# Patient Record
Sex: MALE | Race: WHITE | NOT HISPANIC OR LATINO | Employment: FULL TIME | URBAN - METROPOLITAN AREA
[De-identification: names, ages, dates, MRNs, and addresses within clinical notes are randomized per-mention and may not be internally consistent; named-entity substitution may affect disease eponyms.]

---

## 2022-10-03 ENCOUNTER — HOSPITAL ENCOUNTER (EMERGENCY)
Facility: HOSPITAL | Age: 52
Discharge: HOME | End: 2022-10-03
Attending: EMERGENCY MEDICINE
Payer: COMMERCIAL

## 2022-10-03 VITALS
DIASTOLIC BLOOD PRESSURE: 66 MMHG | OXYGEN SATURATION: 99 % | HEIGHT: 64 IN | BODY MASS INDEX: 29.02 KG/M2 | WEIGHT: 170 LBS | RESPIRATION RATE: 14 BRPM | SYSTOLIC BLOOD PRESSURE: 103 MMHG | TEMPERATURE: 98 F | HEART RATE: 75 BPM

## 2022-10-03 DIAGNOSIS — R42 VERTIGO: Primary | ICD-10-CM

## 2022-10-03 DIAGNOSIS — R42 DIZZY: ICD-10-CM

## 2022-10-03 LAB
ALBUMIN SERPL BCP-MCNC: 4.4 G/DL (ref 3.5–5.2)
ALP SERPL-CCNC: 62 U/L (ref 55–135)
ALT SERPL W/O P-5'-P-CCNC: 32 U/L (ref 10–44)
ANION GAP SERPL CALC-SCNC: 8 MMOL/L (ref 8–16)
AST SERPL-CCNC: 24 U/L (ref 10–40)
BASOPHILS # BLD AUTO: 0.04 K/UL (ref 0–0.2)
BASOPHILS NFR BLD: 0.4 % (ref 0–1.9)
BILIRUB SERPL-MCNC: 0.9 MG/DL (ref 0.1–1)
BNP SERPL-MCNC: 23 PG/ML (ref 0–99)
BUN SERPL-MCNC: 14 MG/DL (ref 6–20)
CALCIUM SERPL-MCNC: 9.3 MG/DL (ref 8.7–10.5)
CHLORIDE SERPL-SCNC: 104 MMOL/L (ref 95–110)
CO2 SERPL-SCNC: 24 MMOL/L (ref 23–29)
CREAT SERPL-MCNC: 0.9 MG/DL (ref 0.5–1.4)
DIFFERENTIAL METHOD: NORMAL
EOSINOPHIL # BLD AUTO: 0.1 K/UL (ref 0–0.5)
EOSINOPHIL NFR BLD: 0.6 % (ref 0–8)
ERYTHROCYTE [DISTWIDTH] IN BLOOD BY AUTOMATED COUNT: 12.4 % (ref 11.5–14.5)
EST. GFR  (NO RACE VARIABLE): >60 ML/MIN/1.73 M^2
GLUCOSE SERPL-MCNC: 91 MG/DL (ref 70–110)
HCT VFR BLD AUTO: 42.1 % (ref 40–54)
HGB BLD-MCNC: 14.4 G/DL (ref 14–18)
IMM GRANULOCYTES # BLD AUTO: 0.04 K/UL (ref 0–0.04)
IMM GRANULOCYTES NFR BLD AUTO: 0.4 % (ref 0–0.5)
LYMPHOCYTES # BLD AUTO: 2.4 K/UL (ref 1–4.8)
LYMPHOCYTES NFR BLD: 23.9 % (ref 18–48)
MCH RBC QN AUTO: 29.6 PG (ref 27–31)
MCHC RBC AUTO-ENTMCNC: 34.2 G/DL (ref 32–36)
MCV RBC AUTO: 86 FL (ref 82–98)
MONOCYTES # BLD AUTO: 0.5 K/UL (ref 0.3–1)
MONOCYTES NFR BLD: 5.3 % (ref 4–15)
NEUTROPHILS # BLD AUTO: 6.9 K/UL (ref 1.8–7.7)
NEUTROPHILS NFR BLD: 69.4 % (ref 38–73)
NRBC BLD-RTO: 0 /100 WBC
PLATELET # BLD AUTO: 371 K/UL (ref 150–450)
PMV BLD AUTO: 10.4 FL (ref 9.2–12.9)
POTASSIUM SERPL-SCNC: 3.7 MMOL/L (ref 3.5–5.1)
PROT SERPL-MCNC: 7.7 G/DL (ref 6–8.4)
RBC # BLD AUTO: 4.87 M/UL (ref 4.6–6.2)
SODIUM SERPL-SCNC: 136 MMOL/L (ref 136–145)
TROPONIN I SERPL DL<=0.01 NG/ML-MCNC: <0.03 NG/ML
WBC # BLD AUTO: 9.98 K/UL (ref 3.9–12.7)

## 2022-10-03 PROCEDURE — 96375 TX/PRO/DX INJ NEW DRUG ADDON: CPT

## 2022-10-03 PROCEDURE — 96374 THER/PROPH/DIAG INJ IV PUSH: CPT

## 2022-10-03 PROCEDURE — 93005 ELECTROCARDIOGRAM TRACING: CPT | Performed by: INTERNAL MEDICINE

## 2022-10-03 PROCEDURE — 85025 COMPLETE CBC W/AUTO DIFF WBC: CPT | Performed by: EMERGENCY MEDICINE

## 2022-10-03 PROCEDURE — 99285 EMERGENCY DEPT VISIT HI MDM: CPT | Mod: 25

## 2022-10-03 PROCEDURE — 93010 EKG 12-LEAD: ICD-10-PCS | Mod: ,,, | Performed by: INTERNAL MEDICINE

## 2022-10-03 PROCEDURE — 93010 ELECTROCARDIOGRAM REPORT: CPT | Mod: ,,, | Performed by: INTERNAL MEDICINE

## 2022-10-03 PROCEDURE — 80053 COMPREHEN METABOLIC PANEL: CPT | Performed by: EMERGENCY MEDICINE

## 2022-10-03 PROCEDURE — 96361 HYDRATE IV INFUSION ADD-ON: CPT

## 2022-10-03 PROCEDURE — 63600175 PHARM REV CODE 636 W HCPCS: Performed by: EMERGENCY MEDICINE

## 2022-10-03 PROCEDURE — 84484 ASSAY OF TROPONIN QUANT: CPT | Performed by: EMERGENCY MEDICINE

## 2022-10-03 PROCEDURE — 83880 ASSAY OF NATRIURETIC PEPTIDE: CPT | Performed by: EMERGENCY MEDICINE

## 2022-10-03 RX ORDER — LORAZEPAM 2 MG/ML
1 INJECTION INTRAMUSCULAR
Status: COMPLETED | OUTPATIENT
Start: 2022-10-03 | End: 2022-10-03

## 2022-10-03 RX ORDER — ONDANSETRON 2 MG/ML
8 INJECTION INTRAMUSCULAR; INTRAVENOUS
Status: COMPLETED | OUTPATIENT
Start: 2022-10-03 | End: 2022-10-03

## 2022-10-03 RX ORDER — MECLIZINE HYDROCHLORIDE 25 MG/1
25 TABLET ORAL 3 TIMES DAILY PRN
Qty: 20 TABLET | Refills: 0 | Status: SHIPPED | OUTPATIENT
Start: 2022-10-03

## 2022-10-03 RX ORDER — ONDANSETRON 4 MG/1
4 TABLET, FILM COATED ORAL EVERY 6 HOURS PRN
Qty: 20 TABLET | Refills: 1 | Status: SHIPPED | OUTPATIENT
Start: 2022-10-03 | End: 2023-10-03

## 2022-10-03 RX ADMIN — SODIUM CHLORIDE, SODIUM LACTATE, POTASSIUM CHLORIDE, AND CALCIUM CHLORIDE 1000 ML: .6; .31; .03; .02 INJECTION, SOLUTION INTRAVENOUS at 04:10

## 2022-10-03 RX ADMIN — LORAZEPAM 1 MG: 2 INJECTION INTRAMUSCULAR; INTRAVENOUS at 04:10

## 2022-10-03 RX ADMIN — ONDANSETRON HYDROCHLORIDE 8 MG: 2 SOLUTION INTRAMUSCULAR; INTRAVENOUS at 04:10

## 2022-10-03 NOTE — FIRST PROVIDER EVALUATION
Emergency Department TeleTriage Encounter Note      CHIEF COMPLAINT    Chief Complaint   Patient presents with    Dizziness     Dizzy and loopy with c/o fluid in his ears.        VITAL SIGNS   Initial Vitals [10/03/22 1320]   BP Pulse Resp Temp SpO2   (!) 152/96 65 16 97.8 °F (36.6 °C) 97 %      MAP       --            ALLERGIES    Review of patient's allergies indicates:  No Known Allergies    PROVIDER TRIAGE NOTE  This is a teletriage evaluation of a 52 y.o. male presenting to the ED complaining of dizziness. Patient reports dizziness this morning when he stood up too fast. Since then the dizziness has been getting worst. He denies previous episodes. He denies numbness, tingling, weakness, or headache.     Initial orders will be placed and care will be transferred to an alternate provider when patient is roomed for a full evaluation. Any additional orders and the final disposition will be determined by that provider.         ORDERS  Labs Reviewed - No data to display    ED Orders (720h ago, onward)      None              Virtual Visit Note: The provider triage portion of this emergency department evaluation and documentation was performed via [employer], a HIPAA-compliant telemedicine application, in concert with a tele-presenter in the room. A face to face patient evaluation with one of my colleagues will occur once the patient is placed in an emergency department room.      DISCLAIMER: This note was prepared with [employer] voice recognition transcription software. Garbled syntax, mangled pronouns, and other bizarre constructions may be attributed to that software system.

## 2022-10-03 NOTE — ED PROVIDER NOTES
Encounter Date: 10/3/2022       History     Chief Complaint   Patient presents with    Dizziness     Dizzy and loopy with c/o fluid in his ears.       52-year-old male presenting emergency department with sudden onset of dizziness and feels like everything is spinning around him.  Patient also has nausea and vomiting and severe dizziness and feeling off balance.  Patient denies any headache or chest pain or shortness of breath dysuria hematuria focal weakness and numbness.  Symptoms started suddenly this morning and worse with movement of his head    Review of patient's allergies indicates:  No Known Allergies  No past medical history on file.  No past surgical history on file.  No family history on file.     Review of Systems   Constitutional: Negative.    HENT: Negative.     Eyes: Negative.    Respiratory: Negative.     Cardiovascular: Negative.    Gastrointestinal:  Positive for nausea and vomiting.   Endocrine: Negative.    Genitourinary: Negative.    Musculoskeletal: Negative.    Skin: Negative.    Allergic/Immunologic: Negative.    Neurological:  Positive for dizziness.   Hematological: Negative.    Psychiatric/Behavioral: Negative.     All other systems reviewed and are negative.    Physical Exam     Initial Vitals [10/03/22 1320]   BP Pulse Resp Temp SpO2   (!) 152/96 65 16 97.8 °F (36.6 °C) 97 %      MAP       --         Physical Exam    Nursing note and vitals reviewed.  Constitutional: He appears well-developed and well-nourished.   HENT:   Head: Normocephalic and atraumatic.   Nose: Nose normal.   Eyes: Conjunctivae and EOM are normal.   Neck: No tracheal deviation present.   Normal range of motion.  Cardiovascular:  Normal rate, regular rhythm, normal heart sounds and intact distal pulses.     Exam reveals no friction rub.       No murmur heard.  Pulmonary/Chest: Breath sounds normal. No respiratory distress. He has no wheezes. He has no rales.   Abdominal: Abdomen is soft. He exhibits no distension.  There is no abdominal tenderness.   Musculoskeletal:         General: Normal range of motion.      Cervical back: Normal range of motion.     Neurological: He is alert and oriented to person, place, and time. He has normal strength. No cranial nerve deficit or sensory deficit. GCS score is 15. GCS eye subscore is 4. GCS verbal subscore is 5. GCS motor subscore is 6.   Movement of head is making patient dizzy and reproducing symptoms of vertigo.   Skin: Skin is warm and dry. Capillary refill takes less than 2 seconds.   Psychiatric: He has a normal mood and affect. Thought content normal.       ED Course   Procedures  Labs Reviewed   CBC W/ AUTO DIFFERENTIAL   COMPREHENSIVE METABOLIC PANEL   TROPONIN I   B-TYPE NATRIURETIC PEPTIDE     EKG Readings: (Independently Interpreted)   Initial Reading: No STEMI. Rhythm: Normal Sinus Rhythm. Ectopy: No Ectopy. Conduction: Normal.   ECG Results              EKG 12-lead (In process)  Result time 10/03/22 16:17:58      In process by Interface, Lab In [facility] (10/03/22 16:17:58)                   Narrative:    Test Reason : R06.02,    Vent. Rate : 054 BPM     Atrial Rate : 054 BPM     P-R Int : 182 ms          QRS Dur : 084 ms      QT Int : 412 ms       P-R-T Axes : 036 058 052 degrees     QTc Int : 390 ms    Sinus bradycardia  Otherwise normal ECG  No previous ECGs available    Referred By: [email or web address]   SELF           Confirmed By:                                   Imaging Results              MRI Brain Without Contrast (Final result)  Result time 10/03/22 17:19:30      Final result by Dayday Iglesias IV, MD (10/03/22 17:19:30)                   Narrative:    MRI of the brain without contrast    HISTORY: Dizziness.    There is mild motion degradation.    The brain appears normally formed and myelinated. There are no findings of acute hemorrhage or infarction. There is no restricted diffusion.    No intra-axial mass or significant mass effect is identified. There is no midline  shift.    The ventricular system is appropriate in size and position for age. There are no extra-axial collections.    Appropriate flow voids are demonstrated within the major blood vessels.    The skull base and craniocervical junction appear unremarkable.    IMPRESSION:    No significant abnormality.    Electronically signed by:  Dayday Iglesias MD  10/3/2022 5:19 PM CDT Workstation: [id number]                                     X-Ray Chest AP Portable (Final result)  Result time 10/03/22 16:25:53      Final result by Sangita Gutierrez MD (10/03/22 16:25:53)                   Narrative:    XR CHEST 1 VIEW    CLINICAL HISTORY:  52 years Male CHF    COMPARISON: None    FINDINGS: Cardiomediastinal silhouette is within normal limits. Lungs are normally expanded with no airspace consolidation. No pleural effusion or pneumothorax. No acute osseous abnormality.    IMPRESSION: No acute pulmonary process.    Electronically signed by:  Sangita Gutierrez MD  10/3/2022 4:25 PM CDT Workstation: [id number]                                     Medications   lactated ringers bolus 1,000 mL (0 mLs Intravenous Stopped 10/3/22 1735)   lorazepam injection 1 mg (1 mg Intravenous Given 10/3/22 1615)   ondansetron injection 8 mg (8 mg Intravenous Given 10/3/22 1614)     Medical Decision Making:   Differential Diagnosis:     32-year-old male with dizziness and disequilibrium and presentation consistent with vertigo.  Screening labs and MRI of brain unremarkable.  Symptoms improved with treatment.  Patient's presentation consistent with vertigo.  As feeling better and neurologically intact discharged with instructions and follow-up and return precautions given.  Clinical Tests:   Lab Tests: Reviewed  Radiological Study: Reviewed  Medical Tests: Reviewed                        Clinical Impression:   Final diagnoses:  [R42] Vertigo (Primary)  [R42] Dizzy        ED Disposition Condition    Discharge Stable          ED Prescriptions        Medication Sig Dispense Start Date End Date Auth. Provider    meclizine (ANTIVERT) 25 mg tablet Take 1 tablet (25 mg total) by mouth 3 (three) times daily as needed. 20 tablet 10/3/2022 -- Tracy Zhou MD    ondansetron (ZOFRAN) 4 MG tablet Take 1 tablet (4 mg total) by mouth every 6 (six) hours as needed. 20 tablet 10/3/2022 10/3/2023 Tracy Zhou MD          Follow-up Information       Follow up With Specialties Details Why Contact Info    [facility] In 2 days  [address] [facility] [address]  [phone]      Yusef Gonzalez MD Neurology In 2 days  [address] [facility] [address]  [phone]               Tracy Zhou MD  10/03/22 9030

## 2023-06-30 DIAGNOSIS — F17.211 CIGARETTE NICOTINE DEPENDENCE IN REMISSION: Primary | ICD-10-CM

## 2023-07-10 ENCOUNTER — HOSPITAL ENCOUNTER (OUTPATIENT)
Dept: RADIOLOGY | Facility: HOSPITAL | Age: 53
Discharge: HOME | End: 2023-07-10
Payer: COMMERCIAL

## 2023-07-10 DIAGNOSIS — F17.211 CIGARETTE NICOTINE DEPENDENCE IN REMISSION: ICD-10-CM

## 2023-07-10 PROCEDURE — 71271 CT THORAX LUNG CANCER SCR C-: CPT | Mod: TC,PO
